# Patient Record
Sex: FEMALE | Race: WHITE
[De-identification: names, ages, dates, MRNs, and addresses within clinical notes are randomized per-mention and may not be internally consistent; named-entity substitution may affect disease eponyms.]

---

## 2019-01-11 NOTE — ERD
ER Documentation


Chief Complaint


Chief Complaint





body rash/itch x 10 days. also  c/o bruising on rash





HPI


This 48-year-old female presents with 10-day history of rash on her bilateral 


axillary area.  She also has a similar rash on her bilateral lateral hips.  She 


denies any known potential allergens or previous rashes.  She is concerned about


some bruising around the rashes.  She is worried she might have leukemia.  She 


denies fevers, vomiting, shortness of breath.





ROS


All systems reviewed and are negative except as per history of present illness.





Medications


Home Meds


Active Scripts


Triamcinolone Acetonide (Triamcinolone Acetonide) 0.1% - 15 Gm Cream.gm., 1 


APPLIC TOP BID for 7 Days, #1 TUB


   Prov:SHELBY DESHPANDE MD         1/11/19


Fexofenadine Hcl* (Allegra*) 180 Mg Tablet, 180 MG PO DAILY, #20 TAB


   Prov:SHELBY DESHPANDE MD         1/11/19


Prednisone* (Prednisone*) 20 Mg Tab, 40 MG PO DAILY for 4 Days, TAB


   Prov:SHELBY DESHPANDE MD         1/11/19


Ibuprofen* (Motrin*) 400 Mg Tab, 400 MG PO Q6H PRN for PAIN AND OR ELEVATED 


TEMP, #30 TAB


   Prov:ROSIE TOLEDO PA-C         11/14/17


Famotidine* (Pepcid*) 20 Mg Tablet, 20 MG PO BID for 30 Days, TAB


   Prov:RAFA ARIZMENDIC         8/15/17


Hydrocodone/Acetaminophen (Norco 5-325 Tablet) 1 Each Tablet, 1 TAB PO Q6H PRN 


for PAIN, #7 TAB


   Prov:RAFA ARIZMENDIC         8/15/17


Prednisone* (Prednisone*) 20 Mg Tab, 40 MG PO DAILY for 4 Days, TAB


   Prov:SHELBY DESHPANDE MD         9/26/16


Hydrocodone/Acetaminophen (Norco 5-325 Tablet) 1 Each Tablet, 1 TAB PO Q6H PRN 


for PAIN, #15 TAB


   Prov:SHELBY DESHPANDE MD         9/26/16


Amoxicillin/Potassium Clav (Amox-Clav 875-125 mg Tablet) 875-125 mg Tab, 1 TAB 


PO BID for 10 Days, #14 TAB


   Prov:SHELBY DESHPANDE MD         9/26/16


Reported Medications


Flurazepam Hcl (Dalmane) 30 Mg Capsule


   6/17/10


Tramadol Hcl* (Ultram*) 50 Mg Tablet, 0 Refills


   6/17/10





Allergies


Allergies:  


Coded Allergies:  


     No Known Allergies (Verified  Allergy, Mild, 11/14/17)





PMhx/Soc


History of Surgery:  Yes (C SECTIONX2, PARTIAL HYSTERECTOMY)


Anesthesia Reaction:  No


Hx Respiratory Disorders:  No


Hx Cardiac Disorders:  No


Hx Psychiatric Problems:  No


Hx Miscellaneous Medical Probl:  Yes (anemia)


Hx Alcohol Use:  No


Hx Substance Use:  No


Hx Tobacco Use:  No


Smoking Status:  Never smoker





FmHx


Family History:  No diabetes, No coronary disease, No other





Physical Exam


Vitals





Vital Signs


  Date      Temp  Pulse  Resp  B/P (MAP)   Pulse Ox  O2          O2 Flow    FiO2


Time                                                 Delivery    Rate


   1/11/19  98.1     83    18      114/63        98


     19:11                           (80)





Physical Exam


Const:   No acute distress


Head:   Atraumatic 


Eyes:    Normal Conjunctiva


ENT:    Normal External Ears, Nose and Mouth.


Neck:               Full range of motion. No meningismus.


Resp:   Clear to auscultation bilaterally


Cardio:   Regular rate and rhythm, no murmurs


Abd:    Soft, non tender, non distended. Normal bowel sounds


Skin:   No petechiae or purpura.  Excoriated maculopapular with some wheals on 


the bilateral lateral axillary area.  There is also similar lesions on the 


bilateral hips and greater trochanter area.  There is some scattered petechia on


the areas of rash.  There is no purpura or significant bruising.


Back:   No midline or flank tenderness


Ext:    No cyanosis, or edema


Neur:   Awake and alert


Psych:    Normal Mood and Affect


Result Diagram:  


1/11/19 2210 1/11/19 2210





Results 24 hrs





Laboratory Tests


Test
                               1/11/19
22:09   1/11/19
22:10  1/11/19
22:15


POC Beta HCG, Qualitative           NEGATIVE


White Blood Count                                    4.7 10^3/ul


Red Blood Count                                     4.60 10^6/ul


Hemoglobin                                             14.0 g/dl


Hematocrit                                                41.2 %


Mean Corpuscular Volume                                  89.6 fl


Mean Corpuscular Hemoglobin                              30.4 pg


Mean Corpuscular                    
                 34.0 g/dl 
  



Hemoglobin
Concent


Red Cell Distribution Width                               12.0 %


Platelet Count                                       207 10^3/UL


Mean Platelet Volume                                      9.9 fl


Immature Granulocytes %                                  0.200 %


Neutrophils %                                             47.6 %


Lymphocytes %                                             41.2 %


Monocytes %                                                7.4 %


Eosinophils %                                              3.4 %


Basophils %                                                0.2 %


Nucleated Red Blood Cells %                          0.0 /100WBC


Immature Granulocytes #                            0.010 10^3/ul


Neutrophils #                                        2.3 10^3/ul


Lymphocytes #                                        2.0 10^3/ul


Monocytes #                                          0.4 10^3/ul


Eosinophils #                                        0.2 10^3/ul


Basophils #                                          0.0 10^3/ul


Nucleated Red Blood Cells #                          0.0 10^3/ul


Sodium Level                                          143 mmol/L


Potassium Level                                       4.2 mmol/L


Chloride Level                                        102 mmol/L


Carbon Dioxide Level                                   29 mmol/L


Anion Gap                                                     12


Blood Urea Nitrogen                                     10 mg/dl


Creatinine                                            0.66 mg/dl


Est Glomerular Filtrat Rate
mL/min  
              > 60 mL/min 
   



Glucose Level                                           90 mg/dl


Calcium Level                                          9.6 mg/dl


Total Bilirubin                                        0.2 mg/dl


Direct Bilirubin                                      0.00 mg/dl


Indirect Bilirubin                                     0.2 mg/dl


Aspartate Amino Transf
(AST/SGOT)   
                   38 IU/L 
  



Alanine                             
                   36 IU/L 
  



Aminotransferase
(ALT/SGPT)


Alkaline Phosphatase                                     90 IU/L


Total Protein                                           7.7 g/dl


Albumin                                                 4.4 g/dl


Globulin                                               3.30 g/dl


Albumin/Globulin Ratio                                      1.33


Bedside Urine pH (LAB)                                                      5.5


Bedside Urine Protein (LAB)                                        Negative


Bedside Urine Glucose (UA)                                         Negative


Bedside Urine Ketones (LAB)                                        Negative


Bedside Urine Blood                                                Trace-lysed


Bedside Urine Nitrite (LAB)                                        Negative


Bedside Urine Leukocyte
Esterase    
              
               Trace 



(L





Current Medications


 Medications
   Dose
          Sig/Qutia
       Start Time
   Status  Last


 (Trade)       Ordered        Route
 PRN     Stop Time              Admin
Dose


                              Reason                                Admin


 Prednisone
    60 mg          ONCE  ONCE
    1/11/19       DC           1/11/19


(Prednisone)                  PO
            22:00
                       22:18



                                             1/11/19 22:01


                25 mg          ONCE  ONCE
    1/11/19       DC           1/11/19


Diphenhydrami                 PO
            22:00
                       22:18



ne
 HCl
                                     1/11/19 22:01


(Benadryl)








Procedures/MDM


CBC shows white blood count of 4.7 otherwise normal as well as CMP and urine 


showed no significant acute abnormalities.  Patient presents with nonspecific r


xuan on the bilateral lateral axilla, bilateral lateral hips.  No evidence of 


cellulitis, signs of purpura or life-threatening rashes.  May be unspecified 


allergy or contact dermatitis.  She will treated with prednisone, Allegra, 


triamcinolone, primary care follow-up and return precautions.  The patient was 


stable with no new complaints during the ER course. Clinically, there is no 


current evidence to suggest meningitis, sepsis, acute abdomen, pneumonia, 


stroke,  acute coronary syndrome, pulmonary embolism, aortic dissection or any 


other emergent condition appearing to require further evaluation or 


hospitalization.  Patient counseled regarding my diagnostic impression and care 


plan. Prior to discharge all questions answered. Pt agrees with treatment plan 


and understands strict return precautions. Pt is instructed to follow up with 


primary care provider within 24-48 hours. Precautionary instructions provided 


including instructions to return to the ER if not improving or for any worsening


or changing symptoms or concerns.





Departure


Diagnosis:  


   Primary Impression:  


   Rash


Condition:  Stable


Patient Instructions:  Dermatitis, Non-Specific


Referrals:  


DOCTOR,NOT ON STAFF (PCP)





Additional Instructions:  


No significant abnormalities on labs today.  May be unspecified allergy.  


Recheck for new or worsening symptoms with primary care doctor.  Observe 


environment for possible causes.  Recheck for fevers, worsening redness, new 


worsening symptoms.











SHELBY DESHPANDE MD             Jan 11, 2019 22:56

## 2019-07-26 ENCOUNTER — HOSPITAL ENCOUNTER (EMERGENCY)
Dept: HOSPITAL 91 - FTE | Age: 49
LOS: 1 days | Discharge: HOME | End: 2019-07-27
Payer: COMMERCIAL

## 2019-07-26 ENCOUNTER — HOSPITAL ENCOUNTER (EMERGENCY)
Dept: HOSPITAL 10 - FTE | Age: 49
LOS: 1 days | Discharge: HOME | End: 2019-07-27
Payer: COMMERCIAL

## 2019-07-26 VITALS
WEIGHT: 195.11 LBS | WEIGHT: 195.11 LBS | HEIGHT: 63 IN | BODY MASS INDEX: 34.57 KG/M2 | BODY MASS INDEX: 34.57 KG/M2 | HEIGHT: 63 IN

## 2019-07-26 DIAGNOSIS — M25.561: Primary | ICD-10-CM

## 2019-07-26 PROCEDURE — 99283 EMERGENCY DEPT VISIT LOW MDM: CPT

## 2019-07-26 RX ADMIN — HYDROCODONE BITARTRATE AND ACETAMINOPHEN 1 TAB: 5; 325 TABLET ORAL at 23:33

## 2019-07-27 VITALS — SYSTOLIC BLOOD PRESSURE: 121 MMHG | HEART RATE: 17 BPM | RESPIRATION RATE: 17 BRPM | DIASTOLIC BLOOD PRESSURE: 84 MMHG

## 2019-07-27 NOTE — ERD
ER Documentation


Chief Complaint


Chief Complaint





sharp rt knee pain sudden onset when walking





HPI


The patient is a 49-year-old female, presenting to the ER because of acute right


knee pain for 3 days after being on her feet more frequently than normal, had 


similar symptom previously, denies trauma, fever, chills, neck pain, chest pain,


dyspnea, abdominal pain, vomiting, dysuria, diarrhea.  She does not smoke nor 


drink





Past medical history: Fibromyalgia 


Past surgical history: 2 , hysterectomy





ROS


All systems reviewed and are negative except as per history of present illness.





Medications


Home Meds


Active Scripts


Hydrocodone/Acetaminophen (Norco 5-325 Tablet) 1 Each Tablet, 1 TAB PO Q6H PRN 


for PAIN, #7 TAB


   Prov:RODRIGO OWENS MD         19


Triamcinolone Acetonide (Triamcinolone Acetonide) 0.1% - 15 Gm Cream.gm., 1 


APPLIC TOP BID for 7 Days, #1 TUB


   Prov:SHELBY DESHPANDE MD         19


Fexofenadine Hcl* (Allegra*) 180 Mg Tablet, 180 MG PO DAILY, #20 TAB


   Prov:SHELBY DESHPANDE MD         19


Prednisone* (Prednisone*) 20 Mg Tab, 40 MG PO DAILY for 4 Days, TAB


   Prov:SHELBY DESHPANDE MD         19


Ibuprofen* (Motrin*) 400 Mg Tab, 400 MG PO Q6H PRN for PAIN AND OR ELEVATED 


TEMP, #30 TAB


   Prov:ROSIE TOLEDO PA-C         17


Famotidine* (Pepcid*) 20 Mg Tablet, 20 MG PO BID for 30 Days, TAB


   Prov:RAFA ARIZMENDI PA-C         8/15/17


Hydrocodone/Acetaminophen (Norco 5-325 Tablet) 1 Each Tablet, 1 TAB PO Q6H PRN 


for PAIN, #7 TAB


   Prov:RAFA ARIZMENDI PA-C         8/15/17


Prednisone* (Prednisone*) 20 Mg Tab, 40 MG PO DAILY for 4 Days, TAB


   Prov:SHELBY DESHPANDE MD         16


Hydrocodone/Acetaminophen (Norco 5-325 Tablet) 1 Each Tablet, 1 TAB PO Q6H PRN 


for PAIN, #15 TAB


   Prov:SHELBY DESHPANDE MD         16


Amoxicillin/Potassium Clav (Amox-Clav 875-125 mg Tablet) 875-125 mg Tab, 1 TAB 


PO BID for 10 Days, #14 TAB


   Prov:SHELBY DESHPANDE MD         16


Reported Medications


Flurazepam Hcl (Dalmane) 30 Mg Capsule


   6/17/10


Tramadol Hcl* (Ultram*) 50 Mg Tablet, 0 Refills


   6/17/10





Allergies


Allergies:  


Coded Allergies:  


     No Known Allergies (Verified  Allergy, Mild, 17)





PMhx/Soc


Medical and Surgical Hx:  pt denies Medical Hx


History of Surgery:  Yes (C SECTIONX2, PARTIAL HYSTERECTOMY)


Anesthesia Reaction:  No


Hx Respiratory Disorders:  No


Hx Cardiac Disorders:  No


Hx Psychiatric Problems:  No


Hx Miscellaneous Medical Probl:  Yes (anemia)


Hx Alcohol Use:  No


Hx Substance Use:  No


Hx Tobacco Use:  No


Smoking Status:  Never smoker





Physical Exam


Vitals





Vital Signs


  Date      Temp  Pulse  Resp  B/P (MAP)   Pulse Ox  O2          O2 Flow    FiO2


Time                                                 Delivery    Rate


   19  97.9     17    17      121/84        98  Room Air


     00:52                           (96)


   19  98.2     94    18      134/83        96


     22:06                          (100)





Physical Exam


 Const:      No acute distress.


 Head:        Atraumatic.


 Eyes:       Normal Conjunctiva.


 ENT:         Normal External Ears, Nose and Mouth.


 Neck:        Full range of motion.  No meningismus.


 Resp:         Clear to auscultation bilaterally.


 Cardio:       Regular rate and rhythm.


 Abd:         Soft,  non distended, normal bowel sounds, non tender.


 Skin:         No petechiae or rashes.


 Back:        No midline or flank tenderness.


 Ext:          Minimal right knee tenderness, no calf tenderness, no ecchymosis,


not warm to touch


 Neur:        Awake and alert. No focal deficit


 Psych:        Normal Mood and Affect.


Results 24 hrs





Current Medications


 Medications
   Dose
          Sig/Quita
       Start Time
   Status  Last


 (Trade)       Ordered        Route
 PRN     Stop Time              Admin
Dose


                              Reason                                Admin


                1 tab          ONCE  ONCE
    19       DC           19


Acetaminophen                 PO
            23:30
                       23:33



/
                                           19 23:31


Hydrocodone


Bitart



([place]


(5/325))








Procedures/MDM


MEDICAL MAKING DECISION: The patient is a 49-year-old female, presenting with 


nontraumatic right knee pain, treated with Norco 5 mg p.o. for pain with good 


response, is stable for outpatient follow-up





The differential diagnoses considered include but are not limited to 


contusion/sprain/strain/fracture/internal derangement





Departure


Diagnosis:  


   Primary Impression:  


   Knee pain


Condition:  Good


Patient Instructions:  Knee Pain, Uncertain Cause


Referrals:  


DOCTOR,NOT ON STAFF





Additional Instructions:  


She was discharged with 7 tablets of Norco 5 mg





The patient's blood pressure was elevated (>120/80) but appears stable without 


evidence of hypertension emergency or urgency.  The patient was counseled about 


the risks of hypertension and urged to pursue outpatient monitoring and therapy 


within a week with their primary care physician.








Call your primary care doctor TOMORROW for an appointment during the next 2-3 


days.See the doctor sooner or return here if your condition worsens before your 


appointment time, advised that if the pain is persistent, she will need MRI for 


further evaluation.





The pain is persistent, you would need MRI for further evaluation











RODRIGO OWENS MD              2019 03:20

## 2019-09-15 ENCOUNTER — HOSPITAL ENCOUNTER (EMERGENCY)
Dept: HOSPITAL 10 - FTE | Age: 49
Discharge: HOME | End: 2019-09-15
Payer: COMMERCIAL

## 2019-09-15 ENCOUNTER — HOSPITAL ENCOUNTER (EMERGENCY)
Dept: HOSPITAL 91 - FTE | Age: 49
Discharge: HOME | End: 2019-09-15
Payer: COMMERCIAL

## 2019-09-15 VITALS — BODY MASS INDEX: 43.88 KG/M2 | HEIGHT: 55 IN | WEIGHT: 189.6 LBS

## 2019-09-15 VITALS — RESPIRATION RATE: 20 BRPM | SYSTOLIC BLOOD PRESSURE: 129 MMHG | DIASTOLIC BLOOD PRESSURE: 67 MMHG | HEART RATE: 113 BPM

## 2019-09-15 DIAGNOSIS — M54.41: Primary | ICD-10-CM

## 2019-09-15 DIAGNOSIS — M25.561: ICD-10-CM

## 2019-09-15 PROCEDURE — 81025 URINE PREGNANCY TEST: CPT

## 2019-09-15 PROCEDURE — 96372 THER/PROPH/DIAG INJ SC/IM: CPT

## 2019-09-15 PROCEDURE — 73562 X-RAY EXAM OF KNEE 3: CPT

## 2019-09-15 PROCEDURE — 72220 X-RAY EXAM SACRUM TAILBONE: CPT

## 2019-09-15 PROCEDURE — 72100 X-RAY EXAM L-S SPINE 2/3 VWS: CPT

## 2019-09-15 PROCEDURE — 99284 EMERGENCY DEPT VISIT MOD MDM: CPT

## 2019-09-15 RX ADMIN — KETOROLAC TROMETHAMINE 1 MG: 30 INJECTION, SOLUTION INTRAMUSCULAR at 15:00

## 2019-09-15 RX ADMIN — DIAZEPAM 1 MG: 5 INJECTION, SOLUTION INTRAMUSCULAR; INTRAVENOUS at 15:00

## 2019-09-16 ENCOUNTER — HOSPITAL ENCOUNTER (EMERGENCY)
Dept: HOSPITAL 54 - ER | Age: 49
Discharge: HOME | End: 2019-09-16
Payer: MEDICAID

## 2019-09-16 VITALS — DIASTOLIC BLOOD PRESSURE: 66 MMHG | SYSTOLIC BLOOD PRESSURE: 116 MMHG

## 2019-09-16 VITALS — WEIGHT: 185 LBS | HEIGHT: 59 IN | BODY MASS INDEX: 37.29 KG/M2

## 2019-09-16 DIAGNOSIS — R00.0: ICD-10-CM

## 2019-09-16 DIAGNOSIS — Z90.710: ICD-10-CM

## 2019-09-16 DIAGNOSIS — G89.29: ICD-10-CM

## 2019-09-16 DIAGNOSIS — E66.9: ICD-10-CM

## 2019-09-16 DIAGNOSIS — R11.2: ICD-10-CM

## 2019-09-16 DIAGNOSIS — E86.0: ICD-10-CM

## 2019-09-16 DIAGNOSIS — R10.12: Primary | ICD-10-CM

## 2019-09-16 DIAGNOSIS — R10.13: ICD-10-CM

## 2019-09-16 DIAGNOSIS — R19.7: ICD-10-CM

## 2019-09-16 DIAGNOSIS — E87.6: ICD-10-CM

## 2019-09-16 LAB
ALBUMIN SERPL BCP-MCNC: 3.2 G/DL (ref 3.4–5)
ALP SERPL-CCNC: 128 U/L (ref 46–116)
ALT SERPL W P-5'-P-CCNC: 51 U/L (ref 12–78)
AST SERPL W P-5'-P-CCNC: 33 U/L (ref 15–37)
BASOPHILS # BLD AUTO: 0 /CMM (ref 0–0.2)
BASOPHILS NFR BLD AUTO: 0.2 % (ref 0–2)
BILIRUB DIRECT SERPL-MCNC: 0.2 MG/DL (ref 0–0.2)
BILIRUB SERPL-MCNC: 0.9 MG/DL (ref 0.2–1)
BUN SERPL-MCNC: 11 MG/DL (ref 7–18)
CALCIUM SERPL-MCNC: 9.3 MG/DL (ref 8.5–10.1)
CHLORIDE SERPL-SCNC: 99 MMOL/L (ref 98–107)
CO2 SERPL-SCNC: 29 MMOL/L (ref 21–32)
CREAT SERPL-MCNC: 0.9 MG/DL (ref 0.6–1.3)
EOSINOPHIL NFR BLD AUTO: 0 % (ref 0–6)
GLUCOSE SERPL-MCNC: 112 MG/DL (ref 74–106)
GLUCOSE UR STRIP-MCNC: (no result) MG/DL
HCT VFR BLD AUTO: 43 % (ref 33–45)
HEMOCCULT STL QL: POSITIVE
HGB BLD-MCNC: 14.4 G/DL (ref 11.5–14.8)
LIPASE SERPL-CCNC: 55 U/L (ref 73–393)
LYMPHOCYTES NFR BLD AUTO: 0.8 /CMM (ref 0.8–4.8)
LYMPHOCYTES NFR BLD AUTO: 5.1 % (ref 20–44)
MCHC RBC AUTO-ENTMCNC: 34 G/DL (ref 31–36)
MCV RBC AUTO: 87 FL (ref 82–100)
MONOCYTES NFR BLD AUTO: 0.9 /CMM (ref 0.1–1.3)
MONOCYTES NFR BLD AUTO: 5.3 % (ref 2–12)
NEUTROPHILS # BLD AUTO: 14.3 /CMM (ref 1.8–8.9)
NEUTROPHILS NFR BLD AUTO: 89.4 % (ref 43–81)
PH UR STRIP: 8.5 [PH] (ref 5–8)
PLATELET # BLD AUTO: 220 /CMM (ref 150–450)
POTASSIUM SERPL-SCNC: 2.8 MMOL/L (ref 3.5–5.1)
PROT SERPL-MCNC: 7.4 G/DL (ref 6.4–8.2)
RBC # BLD AUTO: 4.93 MIL/UL (ref 4–5.2)
RBC #/AREA URNS HPF: (no result) /HPF (ref 0–2)
SODIUM SERPL-SCNC: 140 MMOL/L (ref 136–145)
UROBILINOGEN UR STRIP-MCNC: 0.2 EU/DL
WBC #/AREA URNS HPF: (no result) /HPF (ref 0–3)
WBC NRBC COR # BLD AUTO: 16 K/UL (ref 4.3–11)

## 2019-09-16 PROCEDURE — 96365 THER/PROPH/DIAG IV INF INIT: CPT

## 2019-09-16 PROCEDURE — 96375 TX/PRO/DX INJ NEW DRUG ADDON: CPT

## 2019-09-16 PROCEDURE — 87015 SPECIMEN INFECT AGNT CONCNTJ: CPT

## 2019-09-16 PROCEDURE — 84703 CHORIONIC GONADOTROPIN ASSAY: CPT

## 2019-09-16 PROCEDURE — A4216 STERILE WATER/SALINE, 10 ML: HCPCS

## 2019-09-16 PROCEDURE — 80076 HEPATIC FUNCTION PANEL: CPT

## 2019-09-16 PROCEDURE — 80048 BASIC METABOLIC PNL TOTAL CA: CPT

## 2019-09-16 PROCEDURE — 87493 C DIFF AMPLIFIED PROBE: CPT

## 2019-09-16 PROCEDURE — 85025 COMPLETE CBC W/AUTO DIFF WBC: CPT

## 2019-09-16 PROCEDURE — 82272 OCCULT BLD FECES 1-3 TESTS: CPT

## 2019-09-16 PROCEDURE — 96366 THER/PROPH/DIAG IV INF ADDON: CPT

## 2019-09-16 PROCEDURE — 96367 TX/PROPH/DG ADDL SEQ IV INF: CPT

## 2019-09-16 PROCEDURE — 81001 URINALYSIS AUTO W/SCOPE: CPT

## 2019-09-16 PROCEDURE — 87045 FECES CULTURE AEROBIC BACT: CPT

## 2019-09-16 PROCEDURE — 89055 LEUKOCYTE ASSESSMENT FECAL: CPT

## 2019-09-16 PROCEDURE — 96372 THER/PROPH/DIAG INJ SC/IM: CPT

## 2019-09-16 PROCEDURE — 83690 ASSAY OF LIPASE: CPT

## 2019-09-16 PROCEDURE — 93005 ELECTROCARDIOGRAM TRACING: CPT

## 2019-09-16 PROCEDURE — 36415 COLL VENOUS BLD VENIPUNCTURE: CPT

## 2019-09-16 PROCEDURE — 99284 EMERGENCY DEPT VISIT MOD MDM: CPT

## 2019-09-16 PROCEDURE — 87427 SHIGA-LIKE TOXIN AG IA: CPT

## 2019-09-16 RX ADMIN — POTASSIUM CHLORIDE SCH MLS/HR: 200 INJECTION, SOLUTION INTRAVENOUS at 18:50

## 2019-09-16 RX ADMIN — POTASSIUM CHLORIDE SCH MLS/HR: 200 INJECTION, SOLUTION INTRAVENOUS at 20:12

## 2019-09-16 NOTE — NUR
[facility] to ER for abdominal pain with nausea & vomiting sincE last night at 10 
PM. PATIENT A/OX4, CRYING AND MOANING D/T ABDOMINAL PAIN. ATTACHED TO THE 
MONITOR, CHANGED INTO GOWN. NO DSITRESS NOTED. FAMILY AT BEDSIDE.

## 2019-09-16 NOTE — NUR
IV removed. Catheter intact and site benign. Pressure and 4x4 applied to site. 
No bleeding noted.Patient discharged to home in stable condition. RX and 
Written and verbal after care instructions given. Patient verbalizes 
understanding of instruction.

## 2022-04-11 ENCOUNTER — HOSPITAL ENCOUNTER (EMERGENCY)
Dept: HOSPITAL 54 - ER | Age: 52
Discharge: HOME | End: 2022-04-11
Payer: COMMERCIAL

## 2022-04-11 VITALS — SYSTOLIC BLOOD PRESSURE: 110 MMHG | DIASTOLIC BLOOD PRESSURE: 67 MMHG

## 2022-04-11 VITALS — HEIGHT: 60 IN | BODY MASS INDEX: 36.71 KG/M2 | WEIGHT: 187 LBS

## 2022-04-11 DIAGNOSIS — G89.29: ICD-10-CM

## 2022-04-11 DIAGNOSIS — R10.10: Primary | ICD-10-CM

## 2022-04-11 LAB
ALBUMIN SERPL BCP-MCNC: 3.3 G/DL (ref 3.4–5)
ALP SERPL-CCNC: 155 U/L (ref 46–116)
ALT SERPL W P-5'-P-CCNC: 84 U/L (ref 12–78)
AST SERPL W P-5'-P-CCNC: 58 U/L (ref 15–37)
BASOPHILS # BLD AUTO: 0 K/UL (ref 0–0.2)
BASOPHILS NFR BLD AUTO: 0.2 % (ref 0–2)
BILIRUB DIRECT SERPL-MCNC: 0.1 MG/DL (ref 0–0.2)
BILIRUB SERPL-MCNC: 0.5 MG/DL (ref 0.2–1)
BILIRUB UR QL STRIP: (no result)
BUN SERPL-MCNC: 12 MG/DL (ref 7–18)
CALCIUM SERPL-MCNC: 8.9 MG/DL (ref 8.5–10.1)
CHLORIDE SERPL-SCNC: 105 MMOL/L (ref 98–107)
CO2 SERPL-SCNC: 27 MMOL/L (ref 21–32)
COLOR UR: YELLOW
CREAT SERPL-MCNC: 0.8 MG/DL (ref 0.6–1.3)
DEPRECATED SQUAMOUS URNS QL MICRO: (no result) /HPF
EOSINOPHIL NFR BLD AUTO: 1.7 % (ref 0–6)
GLUCOSE SERPL-MCNC: 98 MG/DL (ref 74–106)
GLUCOSE UR STRIP-MCNC: (no result) MG/DL
HCT VFR BLD AUTO: 41 % (ref 33–45)
HGB BLD-MCNC: 13.9 G/DL (ref 11.5–14.8)
LEUKOCYTE ESTERASE UR QL STRIP: NEGATIVE
LIPASE SERPL-CCNC: 61 U/L (ref 73–393)
LYMPHOCYTES NFR BLD AUTO: 1.2 K/UL (ref 0.8–4.8)
LYMPHOCYTES NFR BLD AUTO: 26.6 % (ref 20–44)
MCHC RBC AUTO-ENTMCNC: 34 G/DL (ref 31–36)
MCV RBC AUTO: 91 FL (ref 82–100)
MONOCYTES NFR BLD AUTO: 0.3 K/UL (ref 0.1–1.3)
MONOCYTES NFR BLD AUTO: 6.6 % (ref 2–12)
NEUTROPHILS # BLD AUTO: 3 K/UL (ref 1.8–8.9)
NEUTROPHILS NFR BLD AUTO: 64.9 % (ref 43–81)
NITRITE UR QL STRIP: NEGATIVE
PH UR STRIP: 5 [PH] (ref 5–8)
PLATELET # BLD AUTO: 168 K/UL (ref 150–450)
POTASSIUM SERPL-SCNC: 3.8 MMOL/L (ref 3.5–5.1)
PROT SERPL-MCNC: 7.2 G/DL (ref 6.4–8.2)
PROT UR QL STRIP: (no result) MG/DL
RBC # BLD AUTO: 4.51 MIL/UL (ref 4–5.2)
RBC #/AREA URNS HPF: (no result) /HPF (ref 0–2)
SODIUM SERPL-SCNC: 140 MMOL/L (ref 136–145)
UROBILINOGEN UR STRIP-MCNC: 1 EU/DL
WBC #/AREA URNS HPF: (no result) /HPF (ref 0–3)
WBC NRBC COR # BLD AUTO: 4.6 K/UL (ref 4.3–11)

## 2022-04-11 PROCEDURE — 81001 URINALYSIS AUTO W/SCOPE: CPT

## 2022-04-11 PROCEDURE — 36415 COLL VENOUS BLD VENIPUNCTURE: CPT

## 2022-04-11 PROCEDURE — 99284 EMERGENCY DEPT VISIT MOD MDM: CPT

## 2022-04-11 PROCEDURE — 76700 US EXAM ABDOM COMPLETE: CPT

## 2022-04-11 PROCEDURE — 83690 ASSAY OF LIPASE: CPT

## 2022-04-11 PROCEDURE — 96374 THER/PROPH/DIAG INJ IV PUSH: CPT

## 2022-04-11 PROCEDURE — 85025 COMPLETE CBC W/AUTO DIFF WBC: CPT

## 2022-04-11 PROCEDURE — 80048 BASIC METABOLIC PNL TOTAL CA: CPT

## 2022-04-11 PROCEDURE — 80076 HEPATIC FUNCTION PANEL: CPT

## 2022-04-11 PROCEDURE — 96375 TX/PRO/DX INJ NEW DRUG ADDON: CPT

## 2022-04-11 NOTE — NUR
BIBS THIS 52YO FEMALE PATIENT, AMBULATORY, WITH CC OF ABDOMINAL PAIN RADIATING 
TO THE BACK SINCE YESTERDAY MORNING. PATIENT IS ALERT, ORIENTED X4. VITALS 
CHECKED.

## 2025-01-02 ENCOUNTER — HOSPITAL ENCOUNTER (EMERGENCY)
Dept: HOSPITAL 54 - ER | Age: 55
Discharge: HOME | End: 2025-01-02
Payer: COMMERCIAL

## 2025-01-02 VITALS — WEIGHT: 140 LBS | HEIGHT: 59 IN | BODY MASS INDEX: 28.22 KG/M2

## 2025-01-02 VITALS — OXYGEN SATURATION: 98 % | SYSTOLIC BLOOD PRESSURE: 145 MMHG | TEMPERATURE: 99.6 F | DIASTOLIC BLOOD PRESSURE: 79 MMHG

## 2025-01-02 DIAGNOSIS — I10: ICD-10-CM

## 2025-01-02 DIAGNOSIS — J34.0: Primary | ICD-10-CM

## 2025-01-02 DIAGNOSIS — G89.29: ICD-10-CM

## 2025-01-02 LAB
BASOPHILS # BLD AUTO: 0 K/UL (ref 0–0.2)
BASOPHILS NFR BLD AUTO: 0.1 % (ref 0–2)
BUN SERPL-MCNC: 9 MG/DL (ref 7–18)
CALCIUM SERPL-MCNC: 9.1 MG/DL (ref 8.5–10.1)
CHLORIDE SERPL-SCNC: 98 MMOL/L (ref 98–107)
CO2 SERPL-SCNC: 28 MMOL/L (ref 21–32)
CREAT SERPL-MCNC: 0.7 MG/DL (ref 0.6–1.3)
EOSINOPHIL # BLD AUTO: 0 K/UL (ref 0–0.7)
EOSINOPHIL NFR BLD AUTO: 0.3 % (ref 0–6)
ERYTHROCYTE [DISTWIDTH] IN BLOOD BY AUTOMATED COUNT: 12.5 % (ref 11.5–15)
GLUCOSE SERPL-MCNC: 91 MG/DL (ref 74–106)
HCT VFR BLD AUTO: 41 % (ref 33–45)
HGB BLD-MCNC: 13.9 G/DL (ref 11.5–14.8)
LYMPHOCYTES NFR BLD AUTO: 1.2 K/UL (ref 0.8–4.8)
LYMPHOCYTES NFR BLD AUTO: 12.8 % (ref 20–44)
MCH RBC QN AUTO: 31 PG (ref 26–33)
MCHC RBC AUTO-ENTMCNC: 34 G/DL (ref 31–36)
MCV RBC AUTO: 92 FL (ref 82–100)
MONOCYTES NFR BLD AUTO: 0.8 K/UL (ref 0.1–1.3)
MONOCYTES NFR BLD AUTO: 7.8 % (ref 2–12)
NEUTROPHILS # BLD AUTO: 7.7 K/UL (ref 1.8–8.9)
NEUTROPHILS NFR BLD AUTO: 79 % (ref 43–81)
PLATELET # BLD AUTO: 202 K/UL (ref 150–450)
POTASSIUM SERPL-SCNC: 3.8 MMOL/L (ref 3.5–5.1)
RBC # BLD AUTO: 4.47 MIL/UL (ref 4–5.2)
SODIUM SERPL-SCNC: 135 MMOL/L (ref 136–145)
WBC NRBC COR # BLD AUTO: 9.7 K/UL (ref 4.3–11)

## 2025-01-02 RX ADMIN — Medication ONE MG: at 22:10

## 2025-01-02 RX ADMIN — Medication ONE MG: at 15:49

## 2025-01-02 RX ADMIN — KETOROLAC TROMETHAMINE ONE MG: 15 INJECTION, SOLUTION INTRAMUSCULAR; INTRAVENOUS at 14:36

## 2025-01-02 RX ADMIN — PIPERACILLIN SODIUM AND TAZOBACTAM SODIUM ONE MLS/HR: .375; 3 INJECTION, POWDER, LYOPHILIZED, FOR SOLUTION INTRAVENOUS at 21:45

## 2025-01-02 RX ADMIN — ACETAMINOPHEN ONE MG: 325 TABLET ORAL at 18:21

## 2025-01-02 RX ADMIN — SODIUM CHLORIDE, SODIUM LACTATE, POTASSIUM CHLORIDE, AND CALCIUM CHLORIDE ONE ML: .6; .31; .03; .02 INJECTION, SOLUTION INTRAVENOUS at 14:34

## 2025-01-02 RX ADMIN — Medication ONE MG: at 16:42

## 2025-01-02 RX ADMIN — Medication ONE MG: at 20:20
